# Patient Record
(demographics unavailable — no encounter records)

---

## 2025-04-01 NOTE — HISTORY OF PRESENT ILLNESS
[FreeTextEntry1] : 26-year-old male in good health has a long history of upper abdominal pain worse with stress.  Has a stressful job, works for [employer], extended hours especially during the holiday season.  In late November underwent an EGD showing duodenal and gastric ulcers.  Biopsies negative for H. pylori.  Treated with omeprazole and Levsin but he stopped taking them once his symptoms improved after about 2 weeks or so.  Repeat EGD was planned after 8 weeks of therapy but he never followed up.  Pain is recurred, near constant but worse after eating.  Some heartburn as well.  No nausea vomiting early satiety or weight loss.  Had an abdominal CAT scan in December which was unremarkable.  No diarrhea or change in bowel habits.  No history of NSAID use.  No history of alcohol abuse.  No family history of GI malignancy.

## 2025-04-01 NOTE — ASSESSMENT
[FreeTextEntry1] : Impression: Peptic ulcer disease, negative for H. pylori and chronic NSAID use, unclear etiology possibly stress related.  Symptoms improved after 2 weeks PPI but medication was discontinued.  Ulcers likely never healed or have recurred.  Plan: Pantoprazole 40 mg daily.  Dicyclomine 20 mg 3 times daily as needed only.  Schedule EGD with biopsy for further evaluation, would be better to postpone several weeks to assess ulcer healing.

## 2025-05-30 NOTE — HISTORY OF PRESENT ILLNESS
[FreeTextEntry1] : The patient-doctor. relationship has been established in a face-to-face fashion on real-time video audio HIPAA compliant communication using telemedicine software. The patient, Chico Coffey was at home and participated in the telephonic visit with the Physician Darrius Cleary MD PhD who was in his medical office. The patient's identity has been confirmed.  The patient was previously emailed a copy of the telemedicine consent.  The patient has had a chance to review and has now given verbal consent and has requested care to be assessed and treated through telemedicine. The patient understands there may be limitations in this process and that they need not need further follow-up care in the office and/or hospital settings.   Verbal consent was given on Tuesday, Oriana 3, 2025 at 9 AM by the patient.  It was requested by the physician.  A written consent was previously sent for the patient to sign and return.  Patient is a 26-year-old gentleman who presents with the chief complaint of testicular discomfort for evaluation. I reviewed the questionnaire he had completed with him in detail.  The patient denies fevers, chills, nausea and/or vomiting and no unexplained weight loss. He has no known drug allergies.  His past medical history demonstrates no significant urologic issues.  In his present occupation as a he has no known toxin exposure.  He does smoke and drinks only socially.  He has no known drug allergies.  His review of systems is non-contributory. His family history is not significant. .

## 2025-05-30 NOTE — ASSESSMENT
[FreeTextEntry1] : This pleasant gentleman presents with concerns regarding testicular discomfort.  I have requested several baseline blood studies and additional imaging. We discussed at length several treatment options including.   Urine analysis was requested.  I will make more specific recommendations after the results of the requested tests return.  Telehealth Consultation: 50 minutes reviewing his history and discussing prior results, discussing various treatment options, writing medication prescriptions, requests for lab testing and writing his note. There was also additional time in preparing for the visit and assisting the patient with technology issues he was having with the telehealth platform.

## 2025-05-30 NOTE — LETTER BODY
[FreeTextEntry1] : Dear ,  Thank you for referring your patient Chico Coffey for consultation for testicular discomfort.  I have requested several baseline blood studies and a semen analysis. I will see the patient back in followup shortly and make further recommendations. I have attached a copy of my consultation note for your records.  Thank you again for this kind referral. I will certainly keep you updated with further progress. Please do not hesitate to call me if you have any questions.  Best regards,    Darrius Cleary M.D., PhD Professor of Urology    [facility] [employer] of \[facility] [place] [facility]\""/[facility]  for , Reproductive and Sexual [facility]    [facility]